# Patient Record
Sex: FEMALE | Race: BLACK OR AFRICAN AMERICAN | NOT HISPANIC OR LATINO | Employment: OTHER | URBAN - METROPOLITAN AREA
[De-identification: names, ages, dates, MRNs, and addresses within clinical notes are randomized per-mention and may not be internally consistent; named-entity substitution may affect disease eponyms.]

---

## 2019-07-21 ENCOUNTER — HOSPITAL ENCOUNTER (EMERGENCY)
Facility: HOSPITAL | Age: 74
Discharge: HOME | End: 2019-07-21
Attending: EMERGENCY MEDICINE
Payer: MEDICARE

## 2019-07-21 VITALS
SYSTOLIC BLOOD PRESSURE: 128 MMHG | TEMPERATURE: 98 F | OXYGEN SATURATION: 100 % | HEIGHT: 66 IN | DIASTOLIC BLOOD PRESSURE: 81 MMHG | BODY MASS INDEX: 40.18 KG/M2 | HEART RATE: 69 BPM | WEIGHT: 250 LBS | RESPIRATION RATE: 18 BRPM

## 2019-07-21 DIAGNOSIS — R07.9 CHEST PAIN: ICD-10-CM

## 2019-07-21 DIAGNOSIS — E86.0 DEHYDRATION: ICD-10-CM

## 2019-07-21 DIAGNOSIS — R53.1 WEAKNESS: Primary | ICD-10-CM

## 2019-07-21 LAB
ALBUMIN SERPL BCP-MCNC: 3.9 G/DL (ref 3.5–5.2)
ALP SERPL-CCNC: 76 U/L (ref 55–135)
ALT SERPL W/O P-5'-P-CCNC: 14 U/L (ref 10–44)
ANION GAP SERPL CALC-SCNC: 14 MMOL/L (ref 8–16)
AST SERPL-CCNC: 23 U/L (ref 10–40)
BASOPHILS # BLD AUTO: 0.01 K/UL (ref 0–0.2)
BASOPHILS NFR BLD: 0.1 % (ref 0–1.9)
BILIRUB SERPL-MCNC: 0.5 MG/DL (ref 0.1–1)
BNP SERPL-MCNC: 12 PG/ML (ref 0–99)
BUN SERPL-MCNC: 33 MG/DL (ref 8–23)
CALCIUM SERPL-MCNC: 10.1 MG/DL (ref 8.7–10.5)
CHLORIDE SERPL-SCNC: 106 MMOL/L (ref 95–110)
CK MB SERPL-MCNC: 1.9 NG/ML (ref 0.1–6.5)
CK MB SERPL-RTO: 1 % (ref 0–5)
CK SERPL-CCNC: 185 U/L (ref 20–180)
CK SERPL-CCNC: 185 U/L (ref 20–180)
CO2 SERPL-SCNC: 21 MMOL/L (ref 23–29)
CREAT SERPL-MCNC: 1.4 MG/DL (ref 0.5–1.4)
D DIMER PPP IA.FEU-MCNC: 1.21 MG/L FEU
DIFFERENTIAL METHOD: ABNORMAL
EOSINOPHIL # BLD AUTO: 0.1 K/UL (ref 0–0.5)
EOSINOPHIL NFR BLD: 0.4 % (ref 0–8)
ERYTHROCYTE [DISTWIDTH] IN BLOOD BY AUTOMATED COUNT: 14.3 % (ref 11.5–14.5)
EST. GFR  (AFRICAN AMERICAN): 43.3 ML/MIN/1.73 M^2
EST. GFR  (NON AFRICAN AMERICAN): 37.6 ML/MIN/1.73 M^2
GLUCOSE SERPL-MCNC: 111 MG/DL (ref 70–110)
HCT VFR BLD AUTO: 39.3 % (ref 37–48.5)
HGB BLD-MCNC: 13.1 G/DL (ref 12–16)
LYMPHOCYTES # BLD AUTO: 0.9 K/UL (ref 1–4.8)
LYMPHOCYTES NFR BLD: 6.1 % (ref 18–48)
MCH RBC QN AUTO: 26.5 PG (ref 27–31)
MCHC RBC AUTO-ENTMCNC: 33.3 G/DL (ref 32–36)
MCV RBC AUTO: 80 FL (ref 82–98)
MONOCYTES # BLD AUTO: 1.6 K/UL (ref 0.3–1)
MONOCYTES NFR BLD: 10.5 % (ref 4–15)
NEUTROPHILS # BLD AUTO: 12.8 K/UL (ref 1.8–7.7)
NEUTROPHILS NFR BLD: 82.9 % (ref 38–73)
PLATELET # BLD AUTO: 248 K/UL (ref 150–350)
PMV BLD AUTO: 9.7 FL (ref 9.2–12.9)
POTASSIUM SERPL-SCNC: 4.4 MMOL/L (ref 3.5–5.1)
PROT SERPL-MCNC: 8.5 G/DL (ref 6–8.4)
RBC # BLD AUTO: 4.94 M/UL (ref 4–5.4)
SODIUM SERPL-SCNC: 141 MMOL/L (ref 136–145)
TROPONIN I SERPL DL<=0.01 NG/ML-MCNC: 0.01 NG/ML (ref 0–0.03)
TROPONIN I SERPL DL<=0.01 NG/ML-MCNC: <0.006 NG/ML (ref 0–0.03)
WBC # BLD AUTO: 15.44 K/UL (ref 3.9–12.7)

## 2019-07-21 PROCEDURE — 93010 EKG 12-LEAD: ICD-10-PCS | Mod: ,,, | Performed by: INTERNAL MEDICINE

## 2019-07-21 PROCEDURE — 85379 FIBRIN DEGRADATION QUANT: CPT | Mod: ER

## 2019-07-21 PROCEDURE — 99285 EMERGENCY DEPT VISIT HI MDM: CPT | Mod: 25,ER

## 2019-07-21 PROCEDURE — 85025 COMPLETE CBC W/AUTO DIFF WBC: CPT | Mod: ER

## 2019-07-21 PROCEDURE — 93005 ELECTROCARDIOGRAM TRACING: CPT | Mod: ER

## 2019-07-21 PROCEDURE — 25000003 PHARM REV CODE 250: Mod: ER | Performed by: NURSE PRACTITIONER

## 2019-07-21 PROCEDURE — 96361 HYDRATE IV INFUSION ADD-ON: CPT | Mod: ER

## 2019-07-21 PROCEDURE — 82550 ASSAY OF CK (CPK): CPT | Mod: ER

## 2019-07-21 PROCEDURE — 84484 ASSAY OF TROPONIN QUANT: CPT | Mod: ER

## 2019-07-21 PROCEDURE — 25500020 PHARM REV CODE 255: Mod: ER | Performed by: EMERGENCY MEDICINE

## 2019-07-21 PROCEDURE — 80053 COMPREHEN METABOLIC PANEL: CPT | Mod: ER

## 2019-07-21 PROCEDURE — 99900035 HC TECH TIME PER 15 MIN (STAT): Mod: ER

## 2019-07-21 PROCEDURE — 83880 ASSAY OF NATRIURETIC PEPTIDE: CPT | Mod: ER

## 2019-07-21 PROCEDURE — 96374 THER/PROPH/DIAG INJ IV PUSH: CPT | Mod: ER

## 2019-07-21 PROCEDURE — 82553 CREATINE MB FRACTION: CPT | Mod: ER

## 2019-07-21 PROCEDURE — 25000003 PHARM REV CODE 250: Mod: ER | Performed by: EMERGENCY MEDICINE

## 2019-07-21 PROCEDURE — 96360 HYDRATION IV INFUSION INIT: CPT | Mod: 59

## 2019-07-21 PROCEDURE — 93010 ELECTROCARDIOGRAM REPORT: CPT | Mod: ,,, | Performed by: INTERNAL MEDICINE

## 2019-07-21 RX ORDER — ONDANSETRON 4 MG/1
4 TABLET, ORALLY DISINTEGRATING ORAL
Status: COMPLETED | OUTPATIENT
Start: 2019-07-21 | End: 2019-07-21

## 2019-07-21 RX ORDER — ASPIRIN 325 MG
325 TABLET ORAL
Status: COMPLETED | OUTPATIENT
Start: 2019-07-21 | End: 2019-07-21

## 2019-07-21 RX ORDER — TAMOXIFEN CITRATE 20 MG/1
20 TABLET ORAL DAILY
COMMUNITY

## 2019-07-21 RX ORDER — SIMVASTATIN 20 MG/1
20 TABLET, FILM COATED ORAL DAILY
COMMUNITY

## 2019-07-21 RX ORDER — ERGOCALCIFEROL 1.25 MG/1
1 CAPSULE ORAL
COMMUNITY

## 2019-07-21 RX ORDER — LOSARTAN POTASSIUM AND HYDROCHLOROTHIAZIDE 25; 100 MG/1; MG/1
1 TABLET ORAL DAILY
COMMUNITY

## 2019-07-21 RX ORDER — LEVOTHYROXINE SODIUM 88 UG/1
1 TABLET ORAL DAILY
COMMUNITY

## 2019-07-21 RX ADMIN — IOHEXOL 100 ML: 350 INJECTION, SOLUTION INTRAVENOUS at 05:07

## 2019-07-21 RX ADMIN — ASPIRIN 325 MG ORAL TABLET 325 MG: 325 PILL ORAL at 03:07

## 2019-07-21 RX ADMIN — ONDANSETRON 4 MG: 4 TABLET, ORALLY DISINTEGRATING ORAL at 03:07

## 2019-07-21 RX ADMIN — SODIUM CHLORIDE 1000 ML: 0.9 INJECTION, SOLUTION INTRAVENOUS at 05:07

## 2019-07-21 NOTE — ED NOTES
Multiple IV attempts by COLLEEN Meza. Able to obtain blood for lab specimen, unable to obtain patent IV. Unable to use R arm for IV access due to prior lumpectomy for breast CA. MD aware. Will attempt with US.

## 2019-07-21 NOTE — ED NOTES
One unsuccessful IV attempt. Unable to obtain blood from IV attempt. DANICA Meza aware. Will attempt IV insertion at this time.

## 2019-07-21 NOTE — ED PROVIDER NOTES
Encounter Date: 7/21/2019       History     Chief Complaint   Patient presents with    Emesis     Reports weakness and emesis. onset approx 20-30 min pta.      Underlying history of hypertension, gastroesophageal reflux disease, breast cancer in the past.  Not recently ill in any identified way.  No history of diabetes or heart disease.  Had just been at [employer], was walking out to her car when she developed nausea, generalized weakness, presyncope, and got in her car with her brother who does not drive.  She drove a short distance but had to stop because of persistent symptoms.  Once she stopped and got out she vomited x3, felt a little better, and called an ambulance.  On arrival, she is still feeling a little nausea but overall better in by the time this dictation all symptoms have resolved.  There was no chest pain, shortness of breath, palpitations, syncope, focal neurologic complaint, or other symptoms. No prior such episode.  EKG in the field and on arrival unremarkable.  Vital signs and exam on arrival unremarkable. No other problems identified.    The history is provided by the patient and the EMS personnel. No  was used.     Review of patient's allergies indicates:   Allergen Reactions    Codeine Hallucinations    Morphine Other (See Comments)     Hypotension     Past Medical History:   Diagnosis Date    Abnormal Pap smear     last abnormal pap smear 3 years ago    Breast cancer     Diverticulitis     GERD (gastroesophageal reflux disease)     Gout     Hypertension     Thyroid disease      Past Surgical History:   Procedure Laterality Date    APPENDECTOMY      COLOSTOMY      with reversal    colostomy reversal      HYSTERECTOMY  1972    fibroid tumors    OOPHORECTOMY Bilateral 1998    BSO for chronic pelvic pain    TOTAL HIP ARTHROPLASTY Right      Family History   Problem Relation Age of Onset    Diabetes Mother     Kidney disease Mother     Heart disease Father      Stroke Father     Breast cancer Neg Hx     Colon cancer Neg Hx     Ovarian cancer Neg Hx      Social History     Tobacco Use    Smoking status: Never Smoker    Smokeless tobacco: Never Used   Substance Use Topics    Alcohol use: No    Drug use: No     Review of Systems   Constitutional: Negative for activity change, fatigue and fever.   HENT: Negative for congestion, ear pain, facial swelling, nosebleeds, sinus pressure and sore throat.    Eyes: Negative for pain, discharge, redness and visual disturbance.   Respiratory: Negative for cough, choking, chest tightness, shortness of breath and wheezing.    Cardiovascular: Negative for chest pain, palpitations and leg swelling.   Gastrointestinal: Positive for nausea and vomiting. Negative for abdominal distention and abdominal pain.   Endocrine: Negative for heat intolerance, polydipsia and polyuria.   Genitourinary: Negative for difficulty urinating, dysuria, flank pain, hematuria and urgency.   Musculoskeletal: Negative for back pain, gait problem, joint swelling and myalgias.   Skin: Negative for color change and rash.   Allergic/Immunologic: Negative for environmental allergies and food allergies.   Neurological: Positive for weakness. Negative for dizziness, numbness and headaches.   Hematological: Negative for adenopathy. Does not bruise/bleed easily.   Psychiatric/Behavioral: Negative for agitation and behavioral problems. The patient is not nervous/anxious.    All other systems reviewed and are negative.      Physical Exam     Initial Vitals [07/21/19 1455]   BP Pulse Resp Temp SpO2   (!) 183/83 90 16 98 °F (36.7 °C) 99 %      MAP       --         Physical Exam    Nursing note and vitals reviewed.  Constitutional: She appears well-developed and well-nourished. She is not diaphoretic. No distress.   Mildly obese   HENT:   Head: Normocephalic and atraumatic.   Mouth/Throat: No oropharyngeal exudate.   Eyes: Conjunctivae and EOM are normal. Pupils are equal,  round, and reactive to light. Right eye exhibits no discharge. Left eye exhibits no discharge. No scleral icterus.   Neck: Normal range of motion. Neck supple. No thyromegaly present. No tracheal deviation present. No JVD present.   Cardiovascular: Normal rate, regular rhythm, normal heart sounds and intact distal pulses. Exam reveals no gallop and no friction rub.    No murmur heard.  Pulmonary/Chest: Breath sounds normal. No stridor. No respiratory distress. She has no wheezes. She has no rhonchi. She has no rales. She exhibits no tenderness.   Abdominal: Soft. Bowel sounds are normal. She exhibits no distension and no mass. There is no tenderness. There is no rebound and no guarding.   Musculoskeletal: Normal range of motion. She exhibits no edema or tenderness.   Neurological: She is alert and oriented to person, place, and time. She has normal strength.   Skin: Skin is warm and dry. No rash and no abscess noted. No erythema.   Psychiatric: She has a normal mood and affect. Her behavior is normal. Judgment and thought content normal.         ED Course   Procedures  Labs Reviewed   COMPREHENSIVE METABOLIC PANEL - Abnormal; Notable for the following components:       Result Value    CO2 21 (*)     Glucose 111 (*)     BUN, Bld 33 (*)     Total Protein 8.5 (*)     eGFR if  43.3 (*)     eGFR if non  37.6 (*)     All other components within normal limits   D DIMER, QUANTITATIVE - Abnormal; Notable for the following components:    D-Dimer 1.21 (*)     All other components within normal limits   CK - Abnormal; Notable for the following components:     (*)     All other components within normal limits   CK-MB - Abnormal; Notable for the following components:     (*)     All other components within normal limits   CBC W/ AUTO DIFFERENTIAL - Abnormal; Notable for the following components:    WBC 15.44 (*)     Mean Corpuscular Volume 80 (*)     Mean Corpuscular Hemoglobin 26.5  (*)     Gran # (ANC) 12.8 (*)     Lymph # 0.9 (*)     Mono # 1.6 (*)     Gran% 82.9 (*)     Lymph% 6.1 (*)     All other components within normal limits   TROPONIN I   B-TYPE NATRIURETIC PEPTIDE   TROPONIN I   URINALYSIS, REFLEX TO URINE CULTURE     EKG Readings: (Independently Interpreted)   Initial Reading: No STEMI. Rhythm: Normal Sinus Rhythm. Heart Rate: 89. Ectopy: No Ectopy. Conduction: Normal. Axis: Normal.   Possible left atrial enlargement, sinus arrhythmia, nonspecific T-wave abnormality, no acute concerning change.       Imaging Results          CTA Chest Non-Coronary (PE Study) (Final result)  Result time 07/21/19 17:22:32    Final result by Sheng Dominguez MD (07/21/19 17:22:32)                 Impression:      No pulmonary embolism or acute aortic disease.  Mild cylindrical bronchiectasis.  Small hiatal hernia.      Electronically signed by: Sheng Dominguez  Date:    07/21/2019  Time:    17:22             Narrative:    EXAMINATION:  CTA CHEST NON CORONARY    CLINICAL HISTORY:  Chest pain, acute, PE suspected, intermed prob, negative D-dimer;    TECHNIQUE:  Low dose axial images, sagittal and coronal reformations were obtained from the thoracic inlet to the lung bases following the IV administration of 100 mL of Omnipaque 350.  All CT scans at this location are performed using dose modulation techniques as appropriate to a performed exam including the following: Automated exposure control; adjustment of the mA and/or kV  according to patient size.    COMPARISON:  07/21/2019    FINDINGS:  Base of Neck: No significant abnormality.    Thoracic soft tissues: Evidence of prior right breast surgery.  Recommend correlation with prior mammography.    Aorta: Left-sided aortic arch.  No aneurysm and no significant atherosclerosis    Heart: Normal size. No effusion.    Pulmonary vasculature: Pulmonary arteries distribute normally.  There are four pulmonary veins.  No evidence of pulmonary  embolism.    Debbi/Mediastinum: No pathologic ronnie enlargement.    Airways: Mild cylindrical bronchiectasis.    Lungs/Pleura: Clear lungs. No pleural effusion or thickening.    Esophagus: Small hiatal hernia.    Upper Abdomen: No abnormality of the partially imaged upper abdomen.    Bones: No acute fracture. No suspicious lytic or sclerotic lesions.                               X-Ray Chest PA And Lateral (Final result)  Result time 07/21/19 16:10:11    Final result by Sheng Dominguez MD (07/21/19 16:10:11)                 Impression:      No acute abnormality.      Electronically signed by: Sheng Dominguez  Date:    07/21/2019  Time:    16:10             Narrative:    EXAMINATION:  XR CHEST PA AND LATERAL    CLINICAL HISTORY:  Chest Pain;    TECHNIQUE:  PA and lateral views of the chest were performed.    COMPARISON:  None    FINDINGS:  The lungs are clear, with normal appearance of pulmonary vasculature and no pleural effusion or pneumothorax.    The cardiac silhouette is normal in size. The hilar and mediastinal contours are unremarkable.    Bones are intact.                                5:33 PM Vital signs stable, resting comfortably, no further symptoms. Counseled, discussed in detail, responding well to IV fluids and rest.  Await repeat troponin, anticipate discharge after that if negative.                              Clinical Impression:     1. Weakness    2. Chest pain    3. Dehydration         Disposition:   Disposition: Discharged  Condition: Stable                        Shad Alvarado MD  07/21/19 1900

## 2019-07-21 NOTE — ED NOTES
Pt informed of need of urine specimen. Aware of plan of care. Awaiting labs. Pt sitting up in stretcher, NAD. Resp e/u. SR on monitor. Family at bedside. Will continue to monitor. Denies any change in status at this time.

## 2019-07-21 NOTE — ED NOTES
Pt transported to restroom via wheelchair per MILA García. Denies nausea at this time. Reports that she feels much better than when she arrived. No complaints or questions at this time.

## 2019-07-21 NOTE — ED NOTES
Pt arrived to ED via [facility] after having episode of weakness, nausea, and diaphoresis after leaving [place] just prior to arrival. Pt reports feeling fine prior to episode. Reports one episode of emesis with nausea.     Level of Consciousness: Patient is awake, alert, oriented to person, place, time, and situation.    Appearance: Pt resting comfortably in stretcher, no acute distress at this time. Clothing appropriately placed and clean. Hygiene is appropriate.   Skin: Skin is warm, intact with mild diaphoresis. Skin turgor is normal/elastic. Mucous membranes moist. Skin color is normal for ethnicity. No skin breakdown noted.  Musculoskeletal: Moves all extremities well. Full active ROM. No deformities noted. Generalized weakness. Gait unwitnessed.   Respiratory: Airway open and patent. Respirations equal and unlabored. Breath sounds clear to auscultation. Denies any SOB.   Cardiac: Regular rate and rhythm. No peripheral edema noted. Radial and pedal pulses present and normal. Capillary refill is within normal limits. Denies chest pain.    GI: Abdomen soft, non-tender to all quadrants with palpation. Bowel sounds present and active in all quads. Obese. Abdomen symmetric with no distention noted. Denies any diarrhea. One episode of emesis prior to arrival, + nausea at this time.     Neurological: Symmetrical expressions noted to face. Equal bilateral . No obvious neurological deficits noted.   Psychosocial: Speech spontaneous, clear, and coherent. Appropriate to situation. Family at bedside. Pt is calm and cooperative.     Pt informed of plan of care, verbalizes understanding, and denies any other questions, complaints, or concerns at this time. Bed in locked in lowest position, siderails up x2, call light within reach. Pt placed on cardiac monitor, blood pressure cuff and continuous pulse ox. Will continue to monitor.

## 2019-07-21 NOTE — ED NOTES
Pt sitting up in stretcher, NAD. resp e/u. Reports that she was having nausea during the CT with contrast, but it has resolved at this time. Denies abdominal pain, nausea, weakness, CP, SOB or any symptoms at this time. Talking to family members at bedside, smiling and laughing. Appears comfortable. No questions at this time. Will continue to monitor. Bed locked in lowest position. Side rails up x 2. Aware of plan of care.

## 2019-07-22 NOTE — ED NOTES
Patient in bed resting at this time with no complaints, chest rise fall equal and unlabored, denies chest pain, dizziness, n/v at this time. Skin warm dry and intact. Iv bolus ns running at this time, patient understands she will be discharged after fluids are finished. C monitor on and recording at this time.

## 2024-06-10 ENCOUNTER — HOSPITAL ENCOUNTER (OUTPATIENT)
Dept: RADIOLOGY | Facility: HOSPITAL | Age: 79
Discharge: HOME | End: 2024-06-10
Attending: INTERNAL MEDICINE
Payer: MEDICARE

## 2024-06-10 DIAGNOSIS — Z96.641 PRESENCE OF RIGHT ARTIFICIAL HIP JOINT: ICD-10-CM

## 2024-06-10 DIAGNOSIS — Z96.641 HISTORY OF RIGHT HIP REPLACEMENT: ICD-10-CM

## 2024-06-10 PROCEDURE — 73502 X-RAY EXAM HIP UNI 2-3 VIEWS: CPT | Mod: 26,RT,, | Performed by: RADIOLOGY

## 2024-06-10 PROCEDURE — 73502 X-RAY EXAM HIP UNI 2-3 VIEWS: CPT | Mod: TC,PO,RT

## 2024-09-18 PROBLEM — C50.919 MALIGNANT NEOPLASM OF BREAST METASTATIC TO AXILLARY LYMPH NODE: Status: ACTIVE | Noted: 2024-09-18

## 2024-09-18 PROBLEM — C77.3 MALIGNANT NEOPLASM OF BREAST METASTATIC TO AXILLARY LYMPH NODE: Status: ACTIVE | Noted: 2024-09-18

## 2024-09-18 PROBLEM — C50.612: Status: ACTIVE | Noted: 2024-09-18

## 2024-09-18 NOTE — PROGRESS NOTES
"         Date of Service: 9/23/2024    Chief Complaint:   Fanny Reid is a 79 y.o. female  is presenting today to discuss her new diagnosis of left breast cancer, its workup and treatment options.    History of Present Illness:   Mrs. Ruiz was diagnosed with "occult" left breast cancer  in July 2024 at the age of 79. ER/OH Positive and HER 2 FISH negative.  Ki67 = 60%. She has a history of right breast cancer now s/p lumpectomy and radiation.  She completed 5 years of Tamoxifen. Gene Negative (2024).  MD::::Pia White MD; Shad Hussein MD; El Lau MD    Past Medical History:   Diagnosis Date    Abnormal Pap smear     last abnormal pap smear 3 years ago    Breast cancer     Diverticulitis     GERD (gastroesophageal reflux disease)     Gout     Hypertension     Thyroid disease       Past Surgical History:   Procedure Laterality Date    APPENDECTOMY      COLOSTOMY      with reversal    colostomy reversal      HYSTERECTOMY  1972    fibroid tumors    OOPHORECTOMY Bilateral 1998    BSO for chronic pelvic pain    TOTAL HIP ARTHROPLASTY Right         Current Outpatient Medications:     allopurinol (ZYLOPRIM) 100 MG tablet, Take 1 tablet (100 mg total) by mouth daily as needed., Disp: 30 tablet, Rfl: 6    cloNIDine (CATAPRES) 0.2 MG tablet, TAKE 1 TABLET BY MOUTH DAILY, Disp: 30 tablet, Rfl: 6    ergocalciferol (ERGOCALCIFEROL) 50,000 unit Cap, Take 1 capsule by mouth every 7 days. , Disp: , Rfl:     levothyroxine (SYNTHROID) 88 MCG tablet, Take 1 tablet by mouth Daily., Disp: , Rfl:     losartan-hydrochlorothiazide 100-25 mg (HYZAAR) 100-25 mg per tablet, Take 1 tablet by mouth Daily., Disp: , Rfl:     simvastatin (ZOCOR) 20 MG tablet, Take 20 mg by mouth Daily., Disp: , Rfl:     tamoxifen (NOLVADEX) 20 MG Tab, Take 20 mg by mouth Daily., Disp: , Rfl:    Review of patient's allergies indicates:   Allergen Reactions    Codeine Hallucinations    Morphine Other (See Comments)     Hypotension    "   Social History     Tobacco Use    Smoking status: Never    Smokeless tobacco: Never   Substance Use Topics    Alcohol use: No      Family History   Problem Relation Name Age of Onset    Diabetes Mother      Kidney disease Mother      Heart disease Father      Stroke Father      Breast cancer Neg Hx      Colon cancer Neg Hx      Ovarian cancer Neg Hx          Review of Systems   Integumentary:  Positive for breast mass. Negative for color change, rash, mole/lesion, breast discharge and breast tenderness.   Breast: Positive for mass.Negative for tenderness       Physical Exam   Constitutional: She appears well-developed. She is cooperative.   HENT:   Head: Normocephalic.   Cardiovascular:  Normal rate and regular rhythm.            Pulmonary/Chest: She exhibits no tenderness and no bony tenderness. Right breast exhibits no mass, no nipple discharge, no skin change and no tenderness. Left breast exhibits mass. Left breast exhibits no nipple discharge, no skin change and no tenderness.       Abdominal: Soft. Normal appearance.   Musculoskeletal: Lymphadenopathy:      Upper Body:      Right upper body: No supraclavicular or axillary adenopathy.      Left upper body: No supraclavicular or axillary adenopathy.     Neurological: She is alert.   Skin: No rash noted.     MAMMOGRAM REPORT: enlarged LN in left axilla    ULTRASOUND REPORT: 3.2 cm lymph node in left axilla    ASSESSMENT and PLAN OF CARE     1. 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  We discussed a multidisciplinary approach to the treatment of her breast cancer - which can include a Medical and Radiation Oncologist, a Geneticists, a Plastic Surgeon, and our Cancer Navigator.  We discussed mastectomy with or without reconstruction in addition to Axillary Pili Dissection as surgical treatments for occult breast cancers.  BCS can be  "offered if the primary tumor can be located.  We discussed the importance of her positive lymph node and the need for axillary dissection at the time of definitive surgery.      We discussed the use of chemotherapy, radiation, and anti-estrogen treatment in detail.  Dr. Hussein would like to proceed with tristin-adjuvant chemotherapy so we will place a Port soon.  She understands that more information will be gained from an MRI, PET Fusion Scan, Lab evaluation (and other imaging studies if needed). She tells me that her MRI and CT Scans were done by Dr. Hussein at the [facility].  I will obtain these for review.  She will return in one week to discuss all results and upcoming surgery.  At least 45 minutes of direct face to face discussions were held with this patient.  She will be followed according to NCCN Guidelines.    Genetic Counseling:  She states that Dr. Hussein genetically tested her and the results were negative.    Consultants:  She has seen Dr. Hussein; El Lau MD    In accordance with [place] R[place] 40:1300.154, the brochure "Breast Cancer Treatment Alternatives" was discussed with and given to this patient during this consult for her new diagnosis of breast cancer. Attention was given to the advantages, disadvantages, risks, and descriptions of the procedure regarding medically viable and efficacious alternative methods of treatment for breast cancer including surgical, radiological, and chemotherapeutic treatments and/or combinations of these treatments. The problems, beneifits and alternatives to breast cancer reconstruction surgery was also discussed. A written summary of these findings is found in my "Consultation Form" that is given to the patient and scanned into the patient medical record.     More than 60 minutes was spent by me on this patient's consultation.  This includes (but is not limited to): researching imaging and pathology reports as available and independently interpreting them; " reviewing the pertinent medical/surgical history and obtaining aspects of these not already in the record; documenting information/findings in the electronic medical record and communicating all results and a plan of care to the patient and her family.  Face to face time is also included as above.         Medical Decision Making:  It is my impression that the patient suffers from all the listed conditions.  Each of these conditions did affect my Plan of Care and all medical decisions that were rendered.  The medical decision making was of high difficulty based on her co-morbidities and her new diagnosis of Breast Cancer, which can pose a direct threat to her life.  Laboratory, staging, and imaging evaluations are currently pending but will be reviewed once available. Any further recommendations will be communicated to the patient by me either prior to or at her pre-operative visit.  I have reviewed and verified her allergies, list of medications, medical and surgical histories, social history, and a pertinent review of symptoms.    Follow up: 1-2 weeks for Pre Op (Port Placement)

## 2024-09-18 NOTE — ASSESSMENT & PLAN NOTE
"This diagnosis of an occult breast cancer was discussed with the patient in detail.  We discussed a multidisciplinary approach to the treatment of her breast cancer - which can include a Medical and Radiation Oncologist, a Geneticists, a Plastic Surgeon, and our Cancer Navigator.  We discussed mastectomy with or without reconstruction in addition to Axillary Pili Dissection as surgical treatments for occult breast cancers.  BCS can be offered if the primary tumor can be located.  We discussed the importance of her positive lymph node and the need for axillary dissection at the time of definitive surgery.      We discussed the use of chemotherapy, radiation, and anti-estrogen treatment in detail.  Dr. Hussein would like to proceed with tristin-adjuvant chemotherapy so we will place a Port soon.  She understands that more information will be gained from an MRI, PET Fusion Scan, Lab evaluation (and other imaging studies if needed). She tells me that her MRI and CT Scans were done by Dr. Hussein at the [facility].  I will obtain these for review.  She will return in one week to discuss all results and upcoming surgery.  At least 45 minutes of direct face to face discussions were held with this patient.  She will be followed according to NCCN Guidelines.    Genetic Counseling:  She states that Dr. Hussein genetically tested her and the results were negative.    Consultants:  She has seen Dr. Hussein; El Lau MD    In accordance with [place] R.S. 40:1300.154, the brochure "Breast Cancer Treatment Alternatives" was discussed with and given to this patient during this consult for her new diagnosis of breast cancer. Attention was given to the advantages, disadvantages, risks, and descriptions of the procedure regarding medically viable and efficacious alternative methods of treatment for breast cancer including surgical, radiological, and chemotherapeutic treatments and/or combinations of these treatments. The " "problems, beneifits and alternatives to breast cancer reconstruction surgery was also discussed. A written summary of these findings is found in my "Consultation Form" that is given to the patient and scanned into the patient medical record.     More than 60 minutes was spent by me on this patient's consultation.  This includes (but is not limited to): researching imaging and pathology reports as available and independently interpreting them; reviewing the pertinent medical/surgical history and obtaining aspects of these not already in the record; documenting information/findings in the electronic medical record and communicating all results and a plan of care to the patient and her family.  Face to face time is also included as above.     "

## 2024-09-23 ENCOUNTER — OFFICE VISIT (OUTPATIENT)
Dept: SURGERY | Facility: CLINIC | Age: 79
End: 2024-09-23
Payer: MEDICARE

## 2024-09-23 DIAGNOSIS — C77.3 MALIGNANT NEOPLASM OF BREAST METASTATIC TO AXILLARY LYMPH NODE: Primary | ICD-10-CM

## 2024-09-23 DIAGNOSIS — C50.919 MALIGNANT NEOPLASM OF BREAST METASTATIC TO AXILLARY LYMPH NODE: Primary | ICD-10-CM

## 2024-09-23 PROCEDURE — 1160F RVW MEDS BY RX/DR IN RCRD: CPT | Mod: CPTII,S$GLB,, | Performed by: SPECIALIST

## 2024-09-23 PROCEDURE — 1159F MED LIST DOCD IN RCRD: CPT | Mod: CPTII,S$GLB,, | Performed by: SPECIALIST

## 2024-09-23 PROCEDURE — 99999 PR PBB SHADOW E&M-EST. PATIENT-LVL I: CPT | Mod: PBBFAC,,, | Performed by: SPECIALIST

## 2024-09-23 PROCEDURE — 99205 OFFICE O/P NEW HI 60 MIN: CPT | Mod: S$GLB,,, | Performed by: SPECIALIST

## 2024-09-24 ENCOUNTER — TELEPHONE (OUTPATIENT)
Dept: SURGERY | Facility: CLINIC | Age: 79
End: 2024-09-24
Payer: MEDICARE

## 2024-09-24 PROBLEM — Z13.71 BRCA NEGATIVE: Status: ACTIVE | Noted: 2024-09-24

## 2024-09-24 NOTE — ASSESSMENT & PLAN NOTE
MRI shows multifocal cancer in the left breast.  She knows that MRM will be needed after chemotherapy.    All testing results have been discussed in detail.  We talked about her workup findings and all Specialty recommendations. All R/B/I and alternatives to treatment have been discussed with the patient with respect to all available surgical options. These risks involve (but are not limited to): bleeding, infections, pneumothorax, injury to a major blood vessel, inability to pass catheter, device failure, medication reaction, poor wound healing, etc.   All testing results have been discussed in detail. She appears to be making informed decisions concerning treatment for her breast cancer. We talked about her workup findings and all Specialty recommendations. We will proceed at her convenience.     PLAN:::PORT (VENOUS ACCESS DEVICE) PLACEMENT.

## 2024-09-24 NOTE — ASSESSMENT & PLAN NOTE
We discussed the implications of her Negative Gene Test at length.  She knows that testing in the future may be necessary as this test will change as more genetic mutations are identified.  She understands that even though she is gene negative - that she can still develop a breast cancer.

## 2024-09-24 NOTE — TELEPHONE ENCOUNTER
SPOKE WITH THE PATIENT TO CONFIRM HER PRE-OP APPT. 09/30/24 @ 9A - SHE VERBALLY AGREED WITHOUT QUESTION OR CONCERN

## 2024-09-24 NOTE — PROGRESS NOTES
"           Date of Service: 9/30/2024    Chief Complaint:   Fanny Reid is a 79 y.o. female  is presenting today to discuss definitive treatment options for her new diagnosis of left breast cancer, its workup and treatment options.    History of Present Illness:   Mrs. Ruiz was diagnosed with "occult" left breast cancer  in July 2024 at the age of 79. MRI confirmed multifocal cancer in the left breast spanning over 5 cm.  ER/OK Positive and HER 2 FISH negative.  Ki67 = 60%. She has a history of right breast cancer now s/p lumpectomy and radiation.  She completed 5 years of Tamoxifen. MyRisk Negative (2024).  MD::::Pia White MD; Shad Hussein MD; El Lau MD    Past Medical History:   Diagnosis Date    Abnormal Pap smear     last abnormal pap smear 3 years ago    Breast cancer     Diverticulitis     GERD (gastroesophageal reflux disease)     Gout     Hypertension     Thyroid disease       Past Surgical History:   Procedure Laterality Date    APPENDECTOMY      COLOSTOMY      with reversal    colostomy reversal      HYSTERECTOMY  1972    fibroid tumors    OOPHORECTOMY Bilateral 1998    BSO for chronic pelvic pain    TOTAL HIP ARTHROPLASTY Right         Current Outpatient Medications:     allopurinol (ZYLOPRIM) 100 MG tablet, Take 1 tablet (100 mg total) by mouth daily as needed., Disp: 30 tablet, Rfl: 6    cloNIDine (CATAPRES) 0.2 MG tablet, TAKE 1 TABLET BY MOUTH DAILY, Disp: 30 tablet, Rfl: 6    ergocalciferol (ERGOCALCIFEROL) 50,000 unit Cap, Take 1 capsule by mouth every 7 days. , Disp: , Rfl:     levothyroxine (SYNTHROID) 88 MCG tablet, Take 1 tablet by mouth Daily., Disp: , Rfl:     losartan-hydrochlorothiazide 100-25 mg (HYZAAR) 100-25 mg per tablet, Take 1 tablet by mouth Daily., Disp: , Rfl:     simvastatin (ZOCOR) 20 MG tablet, Take 20 mg by mouth Daily., Disp: , Rfl:     tamoxifen (NOLVADEX) 20 MG Tab, Take 20 mg by mouth Daily., Disp: , Rfl:    Review of patient's allergies " indicates:   Allergen Reactions    Codeine Hallucinations    Morphine Other (See Comments)     Hypotension      Social History     Tobacco Use    Smoking status: Never    Smokeless tobacco: Never   Substance Use Topics    Alcohol use: No      Family History   Problem Relation Name Age of Onset    Diabetes Mother      Kidney disease Mother      Heart disease Father      Stroke Father      Breast cancer Neg Hx      Colon cancer Neg Hx      Ovarian cancer Neg Hx          Review of Systems   Integumentary:  Positive for breast mass. Negative for color change, rash, mole/lesion, breast discharge and breast tenderness.   Breast: Positive for mass.Negative for tenderness       Physical Exam   Constitutional: She appears well-developed. She is cooperative.   HENT:   Head: Normocephalic.   Cardiovascular:  Normal rate and regular rhythm.            Pulmonary/Chest: She exhibits no tenderness and no bony tenderness. Right breast exhibits no mass, no nipple discharge, no skin change and no tenderness. Left breast exhibits mass. Left breast exhibits no nipple discharge, no skin change and no tenderness.       Abdominal: Soft. Normal appearance.   Musculoskeletal: Lymphadenopathy:      Upper Body:      Right upper body: No supraclavicular or axillary adenopathy.      Left upper body: No supraclavicular or axillary adenopathy.     Neurological: She is alert.   Skin: No rash noted.     MAMMOGRAM REPORT: enlarged LN in left axilla    ULTRASOUND REPORT: 3.2 cm lymph node in left axilla    MRI REPORT: RIGHT breast normal with no abnormal findings; LEFT BREAST with evidence of multifocal cancer    CT REPORT: axillary LAD only with no evidence of distant mets    ASSESSMENT and PLAN OF CARE     1. 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  She knows that MRM will be needed after chemotherapy.    All testing results have been discussed in detail.   We talked about her workup findings and all Specialty recommendations. All R/B/I and alternatives to treatment have been discussed with the patient with respect to all available surgical options. These risks involve (but are not limited to): bleeding, infections, pneumothorax, injury to a major blood vessel, inability to pass catheter, device failure, medication reaction, poor wound healing, etc.   All testing results have been discussed in detail. She appears to be making informed decisions concerning treatment for her breast cancer. We talked about her workup findings and all Specialty recommendations. We will proceed at her convenience.     PLAN:::PORT (VENOUS ACCESS DEVICE) PLACEMENT.       2. BRCA negative  Assessment & Plan:  We discussed the implications of her Negative Gene Test at length.  She knows that testing in the future may be necessary as this test will change as more genetic mutations are identified.  She understands that even though she is gene negative - that she can still develop a breast cancer.       Medical Decision Making:  It is my impression that the patient suffers from all the listed conditions.  Each of these conditions did affect my Plan of Care and all medical decisions that were rendered.  The medical decision making was of moderate to high difficulty based on her co-morbidities and her new diagnosis of Breast Cancer, which can pose a direct threat to her life.  Laboratory, staging, and imaging findings were discussed.  We talked about all risks, benefits, indications and alternatives to standard breast cancer treatments.  I have reviewed and verified her allergies, list of medications, medical and surgical histories, social history, and a pertinent review of symptoms.  I am convinced that she is digesting all this information well and I support her surgical decision.     Follow up: 3 months and prn for PE

## 2024-09-30 ENCOUNTER — OFFICE VISIT (OUTPATIENT)
Dept: SURGERY | Facility: CLINIC | Age: 79
End: 2024-09-30
Payer: MEDICARE

## 2024-09-30 ENCOUNTER — TELEPHONE (OUTPATIENT)
Dept: SURGERY | Facility: CLINIC | Age: 79
End: 2024-09-30

## 2024-09-30 DIAGNOSIS — C77.3 MALIGNANT NEOPLASM OF BREAST METASTATIC TO AXILLARY LYMPH NODE: Primary | ICD-10-CM

## 2024-09-30 DIAGNOSIS — Z13.71 BRCA NEGATIVE: ICD-10-CM

## 2024-09-30 DIAGNOSIS — C50.919 MALIGNANT NEOPLASM OF BREAST METASTATIC TO AXILLARY LYMPH NODE: Primary | ICD-10-CM

## 2024-09-30 PROCEDURE — 1159F MED LIST DOCD IN RCRD: CPT | Mod: CPTII,S$GLB,, | Performed by: SPECIALIST

## 2024-09-30 PROCEDURE — 1160F RVW MEDS BY RX/DR IN RCRD: CPT | Mod: CPTII,S$GLB,, | Performed by: SPECIALIST

## 2024-09-30 PROCEDURE — 99999 PR PBB SHADOW E&M-EST. PATIENT-LVL I: CPT | Mod: PBBFAC,,, | Performed by: SPECIALIST

## 2024-09-30 PROCEDURE — 99214 OFFICE O/P EST MOD 30 MIN: CPT | Mod: S$GLB,,, | Performed by: SPECIALIST

## 2024-09-30 NOTE — TELEPHONE ENCOUNTER
CALL PT. TO INFORM HER OF THE NEW PORT PLACEMENT DATE OF 10/08/24 @ 1P WITH NO ANSWER - LEFT VM OF DATE AND TIME WITH INSTRUCTION TO CALL BACK IF ANY QUESTIONS OR ISSUES.

## 2024-10-08 ENCOUNTER — OUTSIDE PLACE OF SERVICE (OUTPATIENT)
Dept: SURGERY | Facility: CLINIC | Age: 79
End: 2024-10-08
Payer: MEDICARE

## 2024-11-16 ENCOUNTER — HOSPITAL ENCOUNTER (EMERGENCY)
Facility: HOSPITAL | Age: 79
Discharge: HOME | End: 2024-11-16
Attending: EMERGENCY MEDICINE
Payer: MEDICARE

## 2024-11-16 VITALS
SYSTOLIC BLOOD PRESSURE: 141 MMHG | DIASTOLIC BLOOD PRESSURE: 70 MMHG | HEIGHT: 68 IN | HEART RATE: 70 BPM | OXYGEN SATURATION: 100 % | RESPIRATION RATE: 17 BRPM | TEMPERATURE: 98 F | BODY MASS INDEX: 34.86 KG/M2 | WEIGHT: 230 LBS

## 2024-11-16 DIAGNOSIS — R53.1 WEAKNESS: ICD-10-CM

## 2024-11-16 DIAGNOSIS — D72.829 LEUKOCYTOSIS, UNSPECIFIED TYPE: Primary | ICD-10-CM

## 2024-11-16 DIAGNOSIS — R55 SYNCOPE: ICD-10-CM

## 2024-11-16 LAB
ALBUMIN SERPL BCP-MCNC: 3.2 G/DL (ref 3.5–5.2)
ALP SERPL-CCNC: 107 U/L (ref 40–150)
ALT SERPL W/O P-5'-P-CCNC: 16 U/L (ref 10–44)
ANION GAP SERPL CALC-SCNC: 10 MMOL/L (ref 8–16)
ANISOCYTOSIS BLD QL SMEAR: SLIGHT
AST SERPL-CCNC: 13 U/L (ref 10–40)
BACTERIA #/AREA URNS AUTO: NORMAL /HPF
BASOPHILS # BLD AUTO: ABNORMAL K/UL (ref 0–0.2)
BASOPHILS NFR BLD: 0 % (ref 0–1.9)
BILIRUB SERPL-MCNC: 0.6 MG/DL (ref 0.1–1)
BILIRUB UR QL STRIP: NEGATIVE
BUN SERPL-MCNC: 28 MG/DL (ref 8–23)
CALCIUM SERPL-MCNC: 8.7 MG/DL (ref 8.7–10.5)
CHLORIDE SERPL-SCNC: 107 MMOL/L (ref 95–110)
CLARITY UR REFRACT.AUTO: CLEAR
CO2 SERPL-SCNC: 21 MMOL/L (ref 23–29)
COLOR UR AUTO: ABNORMAL
CREAT SERPL-MCNC: 1.2 MG/DL (ref 0.5–1.4)
DIFFERENTIAL METHOD BLD: ABNORMAL
EOSINOPHIL # BLD AUTO: ABNORMAL K/UL (ref 0–0.5)
EOSINOPHIL NFR BLD: 0 % (ref 0–8)
ERYTHROCYTE [DISTWIDTH] IN BLOOD BY AUTOMATED COUNT: 15.6 % (ref 11.5–14.5)
EST. GFR  (NO RACE VARIABLE): 46.6 ML/MIN/1.73 M^2
GLUCOSE SERPL-MCNC: 111 MG/DL (ref 70–110)
GLUCOSE UR QL STRIP: NEGATIVE
HCT VFR BLD AUTO: 33.9 % (ref 37–48.5)
HGB BLD-MCNC: 11.6 G/DL (ref 12–16)
HGB UR QL STRIP: ABNORMAL
HYALINE CASTS UR QL AUTO: 0 /LPF
IMM GRANULOCYTES # BLD AUTO: ABNORMAL K/UL (ref 0–0.04)
IMM GRANULOCYTES NFR BLD AUTO: ABNORMAL % (ref 0–0.5)
KETONES UR QL STRIP: NEGATIVE
LEUKOCYTE ESTERASE UR QL STRIP: NEGATIVE
LYMPHOCYTES # BLD AUTO: ABNORMAL K/UL (ref 1–4.8)
LYMPHOCYTES NFR BLD: 11 % (ref 18–48)
MCH RBC QN AUTO: 26.4 PG (ref 27–31)
MCHC RBC AUTO-ENTMCNC: 34.2 G/DL (ref 32–36)
MCV RBC AUTO: 77 FL (ref 82–98)
METAMYELOCYTES NFR BLD MANUAL: 4 %
MICROSCOPIC COMMENT: NORMAL
MONOCYTES # BLD AUTO: ABNORMAL K/UL (ref 0.3–1)
MONOCYTES NFR BLD: 2 % (ref 4–15)
MYELOCYTES NFR BLD MANUAL: 2 %
NEUTROPHILS NFR BLD: 81 % (ref 38–73)
NITRITE UR QL STRIP: NEGATIVE
NRBC BLD-RTO: 0 /100 WBC
PH UR STRIP: 6 [PH] (ref 5–8)
PLATELET # BLD AUTO: 229 K/UL (ref 150–450)
PMV BLD AUTO: 9.3 FL (ref 9.2–12.9)
POCT GLUCOSE: 119 MG/DL (ref 70–110)
POIKILOCYTOSIS BLD QL SMEAR: SLIGHT
POTASSIUM SERPL-SCNC: 3.4 MMOL/L (ref 3.5–5.1)
PROT SERPL-MCNC: 6.8 G/DL (ref 6–8.4)
PROT UR QL STRIP: ABNORMAL
RBC # BLD AUTO: 4.4 M/UL (ref 4–5.4)
RBC #/AREA URNS AUTO: 0 /HPF (ref 0–4)
SMUDGE CELLS BLD QL SMEAR: PRESENT
SODIUM SERPL-SCNC: 138 MMOL/L (ref 136–145)
SP GR UR STRIP: 1.01 (ref 1–1.03)
SQUAMOUS #/AREA URNS AUTO: 2 /HPF
TROPONIN I SERPL DL<=0.01 NG/ML-MCNC: 0.01 NG/ML (ref 0–0.03)
URN SPEC COLLECT METH UR: ABNORMAL
UROBILINOGEN UR STRIP-ACNC: NEGATIVE EU/DL
WBC # BLD AUTO: 18.37 K/UL (ref 3.9–12.7)
WBC #/AREA URNS AUTO: 5 /HPF (ref 0–5)

## 2024-11-16 PROCEDURE — 85007 BL SMEAR W/DIFF WBC COUNT: CPT | Mod: ER | Performed by: NURSE PRACTITIONER

## 2024-11-16 PROCEDURE — 86803 HEPATITIS C AB TEST: CPT | Performed by: EMERGENCY MEDICINE

## 2024-11-16 PROCEDURE — 96374 THER/PROPH/DIAG INJ IV PUSH: CPT | Mod: ER

## 2024-11-16 PROCEDURE — 85027 COMPLETE CBC AUTOMATED: CPT | Mod: ER | Performed by: NURSE PRACTITIONER

## 2024-11-16 PROCEDURE — 63600175 PHARM REV CODE 636 W HCPCS: Mod: ER | Performed by: EMERGENCY MEDICINE

## 2024-11-16 PROCEDURE — 87389 HIV-1 AG W/HIV-1&-2 AB AG IA: CPT | Performed by: EMERGENCY MEDICINE

## 2024-11-16 PROCEDURE — 93005 ELECTROCARDIOGRAM TRACING: CPT | Mod: ER

## 2024-11-16 PROCEDURE — 80053 COMPREHEN METABOLIC PANEL: CPT | Mod: ER | Performed by: NURSE PRACTITIONER

## 2024-11-16 PROCEDURE — 93010 ELECTROCARDIOGRAM REPORT: CPT | Mod: ,,, | Performed by: INTERNAL MEDICINE

## 2024-11-16 PROCEDURE — 81000 URINALYSIS NONAUTO W/SCOPE: CPT | Mod: ER | Performed by: NURSE PRACTITIONER

## 2024-11-16 PROCEDURE — 99285 EMERGENCY DEPT VISIT HI MDM: CPT | Mod: 25,ER

## 2024-11-16 PROCEDURE — 84484 ASSAY OF TROPONIN QUANT: CPT | Mod: ER | Performed by: NURSE PRACTITIONER

## 2024-11-16 RX ORDER — HEPARIN 100 UNIT/ML
5 SYRINGE INTRAVENOUS
Status: COMPLETED | OUTPATIENT
Start: 2024-11-16 | End: 2024-11-16

## 2024-11-16 RX ADMIN — HEPARIN 500 UNITS: 100 SYRINGE at 02:11

## 2024-11-16 NOTE — ED PROVIDER NOTES
Encounter Date: 11/16/2024       History     Chief Complaint   Patient presents with    Weakness     Pt had near syncopal episode/ decreased LOC while at [employer]. Came in with aasi. Cbg 130. Pt gcs 15 on arrival. C/o mild headache. Pt vomited one time pta. Currently undergoing chemo for left breast cancer, last chemo was Tuesday.      79-year-old female with complaint of syncopal episode about 1 hour prior to arrival.  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  Patient denies chest pain.  Patient denies abdominal pain.  Patient reports she vomited once.  Patient reports she feels normal at present but has little fatigue.  There was no witnessed seizure activity.        Review of patient's allergies indicates:   Allergen Reactions    Codeine Hallucinations    Morphine Other (See Comments)     Hypotension     Past Medical History:   Diagnosis Date    Abnormal Pap smear     last abnormal pap smear 3 years ago    Breast cancer     Diverticulitis     GERD (gastroesophageal reflux disease)     Gout     Hypertension     Thyroid disease      Past Surgical History:   Procedure Laterality Date    APPENDECTOMY      COLOSTOMY      with reversal    colostomy reversal      HYSTERECTOMY  1972    fibroid tumors    OOPHORECTOMY Bilateral 1998    BSO for chronic pelvic pain    TOTAL HIP ARTHROPLASTY Right      Family History   Problem Relation Name Age of Onset    Diabetes Mother      Kidney disease Mother      Heart disease Father      Stroke Father      Breast cancer Neg Hx      Colon cancer Neg Hx      Ovarian cancer Neg Hx       Social History     Tobacco Use    Smoking status: Never    Smokeless tobacco: Never   Substance Use Topics    Alcohol use: No    Drug use: No     Review of Systems   Constitutional:  Negative for fever.   HENT:  Negative for sore throat.    Respiratory:  Negative for shortness of breath.     Cardiovascular:  Negative for chest pain.   Gastrointestinal:  Negative for nausea.   Genitourinary:  Negative for dysuria.   Musculoskeletal:  Negative for back pain.   Skin:  Negative for rash.   Neurological:  Positive for syncope. Negative for weakness.   Hematological:  Does not bruise/bleed easily.       Physical Exam     Initial Vitals [11/16/24 1214]   BP Pulse Resp Temp SpO2   121/61 76 16 98.2 °F (36.8 °C) 98 %      MAP       --         Physical Exam    Nursing note and vitals reviewed.  Constitutional: She appears well-developed and well-nourished.   HENT:   Head: Normocephalic and atraumatic.   Eyes: Conjunctivae and EOM are normal. Pupils are equal, round, and reactive to light.   Neck: Neck supple.   Normal range of motion.  Cardiovascular:  Normal rate, regular rhythm, normal heart sounds and intact distal pulses.           Pulmonary/Chest: Breath sounds normal.   Abdominal: Abdomen is soft. There is no abdominal tenderness. There is no rebound and no guarding.   Musculoskeletal:         General: Normal range of motion.      Cervical back: Normal range of motion and neck supple.     Neurological: She is alert and oriented to person, place, and time. She has normal strength and normal reflexes.   Skin: Skin is warm and dry.   Psychiatric: She has a normal mood and affect. Her behavior is normal. Thought content normal.         ED Course   Procedures  Labs Reviewed   CBC W/ AUTO DIFFERENTIAL - Abnormal       Result Value    WBC 18.37 (*)     RBC 4.40      Hemoglobin 11.6 (*)     Hematocrit 33.9 (*)     MCV 77 (*)     MCH 26.4 (*)     MCHC 34.2      RDW 15.6 (*)     Platelets 229      MPV 9.3      Immature Granulocytes CANCELED      Immature Grans (Abs) CANCELED      Lymph # CANCELED      Mono # CANCELED      Eos # CANCELED      Baso # CANCELED      nRBC 0      Gran % 81.0 (*)     Lymph % 11.0 (*)     Mono % 2.0 (*)     Eosinophil % 0.0      Basophil % 0.0      Metamyelocytes 4.0      Myelocytes 2.0       Aniso Slight      Poik Slight      Smudge Cells Present      Differential Method Manual      Narrative:     Release to patient->Immediate   COMPREHENSIVE METABOLIC PANEL - Abnormal    Sodium 138      Potassium 3.4 (*)     Chloride 107      CO2 21 (*)     Glucose 111 (*)     BUN 28 (*)     Creatinine 1.2      Calcium 8.7      Total Protein 6.8      Albumin 3.2 (*)     Total Bilirubin 0.6      Alkaline Phosphatase 107      AST 13      ALT 16      eGFR 46.6 (*)     Anion Gap 10      Narrative:     Release to patient->Immediate   URINALYSIS, REFLEX TO URINE CULTURE - Abnormal    Specimen UA Urine, Clean Catch      Color, UA Straw      Appearance, UA Clear      pH, UA 6.0      Specific Gravity, UA 1.010      Protein, UA 1+ (*)     Glucose, UA Negative      Ketones, UA Negative      Bilirubin (UA) Negative      Occult Blood UA Trace (*)     Nitrite, UA Negative      Urobilinogen, UA Negative      Leukocytes, UA Negative      Narrative:     Specimen Source->Urine   POCT GLUCOSE - Abnormal    POCT Glucose 119 (*)    TROPONIN I    Troponin I 0.007      Narrative:     Release to patient->Immediate   URINALYSIS MICROSCOPIC    RBC, UA 0      WBC, UA 5      Bacteria Occasional      Squam Epithel, UA 2      Hyaline Casts, UA 0      Microscopic Comment SEE COMMENT      Narrative:     Specimen Source->Urine   HEPATITIS C ANTIBODY   HEP C VIRUS HOLD SPECIMEN   HIV 1 / 2 ANTIBODY     EKG Readings: (Independently Interpreted)   Other EKG Interpretations: EKG: NSR with rate of 70, non specific t wave inversion V3       Imaging Results              X-Ray Chest 1 View (Final result)  Result time 11/16/24 13:34:30      Final result by Elisa Galindo MD (Timothy) (11/16/24 13:34:30)                   Impression:      No infiltrates      Electronically signed by: Elisa Galindo MD  Date:    11/16/2024  Time:    13:34               Narrative:    EXAMINATION:  XR CHEST 1 VIEW    CLINICAL HISTORY:  Syncope,    COMPARISON:  Chest,  07/19/2021.    FINDINGS:  One view.  MediPort catheter tip at the SVC level.  Normal size heart.  Clear lungs.                                       Medications - No data to display  Medical Decision Making  2:00 PM  Patient reports feeling well.  Patient has no past or recent report of chest pain or shortness of breath.  Patient reports she feels normal.  Do not suspect pulmonary embolus because patient has no shortness of breath or chest pain.  EKG does not show any changes suggestive of pulmonary embolus.  Orthostatics were negative. VS WNL.  Patient appeared to have had a vasovagal episode of syncope after sitting down to eat.  Since workup negative, and no suspicion of pulmonary embolus, patient will be discharged.  Patient instructed to return for any chest pain, shortness of breath, or any weakness, dizziness, or syncope.    Amount and/or Complexity of Data Reviewed  Labs: ordered.  Radiology: ordered.                                      Clinical Impression:  Final diagnoses:  [R53.1] Weakness  [R55] Syncope  [D72.829] Leukocytosis, unspecified type (Primary)          ED Disposition Condition    Discharge Stable          ED Prescriptions    None       Follow-up Information       Follow up With Specialties Details Why Contact Info    Antonio Rodriguez III, MD Internal Medicine Schedule an appointment as soon as possible for a visit in 2 days  [address]  [facility] [address]  [phone]               Dony Monaco NP  11/16/24 1447       Dony Monaco NP  11/16/24 1442

## 2024-11-17 LAB
HCV AB SERPL QL IA: NEGATIVE
HEP C VIRUS HOLD SPECIMEN: NORMAL
HIV 1+2 AB+HIV1 P24 AG SERPL QL IA: NEGATIVE
OHS QRS DURATION: 78 MS
OHS QTC CALCULATION: 326 MS

## 2025-04-03 ENCOUNTER — TELEPHONE (OUTPATIENT)
Dept: HEMATOLOGY/ONCOLOGY | Facility: CLINIC | Age: 80
End: 2025-04-03
Payer: MEDICARE

## 2025-04-03 NOTE — TELEPHONE ENCOUNTER
SW reach out to pt to assist in getting pt a mastectomy bra. PEDRO signed pt up for the [facility] and [facility] collaborative free bra program. PEDRO also sent referral for financial assistance and mastectomy bras to [facility]. Pt reported having a high bill at [facility]. SW encouraged pt to contact [facility] financial assistance and  to find out if they have any services available. SW encouraged pt to contact SS as needed. SW will remain available.

## 2025-04-15 ENCOUNTER — PATIENT MESSAGE (OUTPATIENT)
Dept: NEUROLOGY | Facility: CLINIC | Age: 80
End: 2025-04-15
Payer: MEDICARE

## 2025-07-31 DIAGNOSIS — M25.562 CHRONIC PAIN OF LEFT KNEE: Primary | ICD-10-CM

## 2025-07-31 DIAGNOSIS — G89.29 CHRONIC PAIN OF LEFT KNEE: Primary | ICD-10-CM

## 2025-07-31 NOTE — PROGRESS NOTES
[address] [facility] [place] [address]   Phone [phone]  Fax [phone]           CHIEF COMPLAINT: Pain of the Left Knee       REFERRING PROVIDER: Dr. Pia White    HISTORY OF PRESENT ILLNESS (JN) (08/01/2025):    HPI:  Fanny presents for evaluation of left knee pain that started about a month ago. Pain initially began in the back area, attributed to an L5 issue, then progressed to the knee. She rates pain as 8/10 in severity and reports significant crepitus in the knee. She has difficulty fully straightening the left knee compared to the right. No previous knee injections, but she reports having had knee problems before. She occasionally takes Advil or Aleve for pain relief. She has tried using a copper brace and Voltaren gel in the past but found them ineffective.    She also reports back issues affecting the sciatic nerve, with a history of injections that had varying success. She recalls one injection working well, while another was ineffective during a cruise when she had difficulty walking.    She has a history of diverticulosis and denies having diabetes.  The majority of her knee pain is on the inside of her knee as well as behind her knee         Disclaimer: The history above was obtained through ambient listening technology thus may contain errors.     PAST MEDICAL HISTORY:    Past Medical History:   Diagnosis Date    Abnormal Pap smear     last abnormal pap smear 3 years ago    Breast cancer     Diverticulitis     GERD (gastroesophageal reflux disease)     Gout     Hypertension     Thyroid disease        Patient's Latest A1C:  Lab Results   Component Value Date    HGBA1C 6 02/04/2025          PAST SURGICAL HISTORY:    Past Surgical History:   Procedure Laterality Date    APPENDECTOMY      COLOSTOMY      with reversal    colostomy reversal      HYSTERECTOMY  01/01/1972    fibroid tumors    LUMPECTOMY, BREAST Right 2017    MASTECTOMY Left 03/06/2025    OOPHORECTOMY  Bilateral 01/01/1998    BSO for chronic pelvic pain    TOTAL HIP ARTHROPLASTY Right         MEDICATIONS:    Current Outpatient Medications:     allopurinol (ZYLOPRIM) 100 MG tablet, Take 1 tablet (100 mg total) by mouth daily as needed., Disp: 30 tablet, Rfl: 6    ascorbic acid, vitamin C, (VITAMIN C) 1000 MG tablet, Take 1,000 mg by mouth once daily., Disp: , Rfl:     b complex vitamins capsule, Take 1 capsule by mouth once daily., Disp: , Rfl:     cloNIDine (CATAPRES) 0.2 MG tablet, TAKE 1 TABLET BY MOUTH DAILY, Disp: 30 tablet, Rfl: 6    ergocalciferol (ERGOCALCIFEROL) 50,000 unit Cap, Take 1 capsule by mouth every 7 days. , Disp: , Rfl:     levothyroxine (SYNTHROID) 88 MCG tablet, Take 1 tablet by mouth Daily., Disp: , Rfl:     losartan-hydrochlorothiazide 100-25 mg (HYZAAR) 100-25 mg per tablet, Take 1 tablet by mouth Daily., Disp: , Rfl:     pantoprazole (PROTONIX) 40 MG tablet, Take 40 mg by mouth., Disp: , Rfl:     simvastatin (ZOCOR) 20 MG tablet, Take 20 mg by mouth Daily., Disp: , Rfl:     sulfamethoxazole-trimethoprim 800-160mg (BACTRIM DS) 800-160 mg Tab, Take 1 tablet by mouth., Disp: , Rfl:     tamoxifen (NOLVADEX) 20 MG Tab, Take 20 mg by mouth Daily., Disp: , Rfl:      ALLERGIES:     Review of patient's allergies indicates:   Allergen Reactions    Codeine Hallucinations    Morphine Other (See Comments)     Hypotension            FAMILY HISTORY:   Family History   Problem Relation Name Age of Onset    Diabetes Mother      Kidney disease Mother      Heart disease Father      Stroke Father      Breast cancer Neg Hx      Colon cancer Neg Hx      Ovarian cancer Neg Hx             SOCIAL HISTORY:    Social History     Socioeconomic History    Marital status:     Number of children: 0   Tobacco Use    Smoking status: Never    Smokeless tobacco: Never   Substance and Sexual Activity    Alcohol use: No    Drug use: No    Sexual activity: Never     Social Drivers of Health     Financial Resource  "Strain: Patient Declined (3/18/2025)    Received from [facility]    Overall Financial Resource Strain (CARDIA)     Difficulty of Paying Living Expenses: Patient declined   Food Insecurity: No Food Insecurity (6/3/2025)    Received from [facility]    Hunger Vital Sign     Worried About Running Out of Food in the Last Year: Never true     Ran Out of Food in the Last Year: Never true   Transportation Needs: No Transportation Needs (6/3/2025)    Received from [facility]    PRAPARE - Transportation     Lack of Transportation (Medical): No     Lack of Transportation (Non-Medical): No   Physical Activity: Patient Declined (3/18/2025)    Received from [facility]    Exercise Vital Sign     Days of Exercise per Week: Patient declined     Minutes of Exercise per Session: Patient declined   Stress: Patient Declined (3/18/2025)    Received from [facility]    [employer] - Occupational Stress Questionnaire     Feeling of Stress : Patient declined   Housing Stability: Low Risk  (6/3/2025)    Received from [facility]    Housing Stability Vital Sign     Unable to Pay for Housing in the Last Year: No     Number of Times Moved in the Last Year: 0     Homeless in the Last Year: No       PHYSICAL EXAMINATION:     Estimated body mass index is 34.96 kg/m² as calculated from the following:    Height as of this encounter: 5' 8" (1.727 m).    Weight as of this encounter: 104.3 kg (229 lb 15 oz).   MUSCULOSKELETAL:    Knee Exam (affected extremity):   Inspection:    Skin:    Discoloration   (-)   Gross deformity   (-)    Open wounds   (-)   Surgical scars   (-)      Soft tissue:     Swelling/Knee Effusion  (+)   Muscle atrophy   (-)   Palpation tenderness:    Bony:     Tibial tubercle   (-)   Patella    (-)   Tibial plateau   (-)   Femoral condyle  (-)   Soft tissue:    Pes anserine bursae  (-)   Patellar tendon   (-)   Quadriceps tendon  (-)   MCL    (-) "   LCL    (-)  ITB    (-)   ROM:   Affected extremity:        Extension:   0°            Flexion    130°            Pain    (+)           Crepitance   (+)           Sensory:     Normal sensation to light touch in Sa/Funk/DP/SP/T nerve distributions      Motor:                  Fires EHL/FHL/Tibialis anterior/Gastrocsoleus   Vascular:                 Foot WWP with brisk capillary refill      DP/PT pulses palpable   Special tests:    Collateral ligaments:     Stable to varus and valgus stress at 0° and 30° of knee flexion      Cruciate ligaments:   Lachman   (-)   Pivot shift   (-)   Anterior drawer   (-)   Posterior drawer  (-)  Menisci:     Medial JLT   (+)   Lateral JLT   (+)    Apley/Jacquelyns test  (-)  Patella:    Patellar grind/Blanco's Test (+)   Patellar apprehension  (-).        IMAGING:      X-Ray Knee Complete 4 or More Views Left  Narrative: EXAMINATION:  XR KNEE COMP 4 OR MORE VIEWS LEFT    CLINICAL HISTORY:  Pain in left knee    TECHNIQUE:  AP, Lateral, Sunrise and Tunnel views of the left knee were preformed    COMPARISON:  None    FINDINGS:  Bilateral multi compartment degenerative findings most prevalent within the left greater than right patellofemoral and left knee medial compartment including joint space loss.  No large joint effusion or acute osseous abnormality.  Impression: As above    Electronically signed by: Frantz Christine MD  Date:    08/01/2025  Time:    09:03         ASSESSMENT: 80 y.o. female  with:   Left knee arthritis s/p steroid injection 8/4/25    PLAN:  Data:  I independently visualized images including but not limited to xrays, MRI, or CT scan today  I reviewed available old/outside records  Medications:  History of diverticulosis.  Perhaps avoid anti-inflammatories?  DME and weightbearing status:  Hinged knee brace prescribed today. 15 minutes were spent sizing, fitting, and educating for durable medical equipment application today.  [id number].    Limit pounding through her  knee  Injections/Procedures/Surgey:  She received a left knee steroid injection today.  If her steroid shot does not work she would be a candidate for Visco supplement injections  Education:  I had a long discussion with the patient about the causes, treatments, and prognosis/natural history for this condition.  We discussed effective ways to improve symptoms as well as what types of activities may make the symptoms/prognosis worse. We discussed signs and symptoms and other reasons to return to clinic sooner.  I had a long discussion with the patient about the causes, treatments, and prognosis for knee arthritis. We discussed that although there is not a cure for arthritis, there are effective ways to improve symptoms. Patient educated that exercise/weight loss can reduce pain and improve overall health in most people. I recommended low impact activities such as elliptical and bicycle. I talked about the fact that aquatic and pool therapy can be helpful. We talked about the importance of knee motion in the health of the knee. Knee braces may help limit swelling and provide proprioceptive feedback. We recommend formal physical therapy or at minimum a home exercise program, which we discussed with the patient.   Return to clinic:  4-6 weeks with Cristel  Images needed at next visit: None    This note was generated with the assistance of ambient listening technology thus some errors may exist.  Please contact the author of this note for any clarification.           Physician Signature: Salome Paiz M.D.       Official Website  Schedule An Appointment

## 2025-08-01 ENCOUNTER — HOSPITAL ENCOUNTER (OUTPATIENT)
Dept: RADIOLOGY | Facility: HOSPITAL | Age: 80
Discharge: HOME | End: 2025-08-01
Attending: ORTHOPAEDIC SURGERY
Payer: MEDICARE

## 2025-08-01 ENCOUNTER — OFFICE VISIT (OUTPATIENT)
Dept: ORTHOPEDICS | Facility: CLINIC | Age: 80
End: 2025-08-01
Payer: MEDICARE

## 2025-08-01 VITALS — WEIGHT: 229.94 LBS | BODY MASS INDEX: 34.85 KG/M2 | HEIGHT: 68 IN

## 2025-08-01 DIAGNOSIS — M17.12 ARTHRITIS OF LEFT KNEE: Primary | ICD-10-CM

## 2025-08-01 DIAGNOSIS — M25.562 CHRONIC PAIN OF LEFT KNEE: ICD-10-CM

## 2025-08-01 DIAGNOSIS — G89.29 CHRONIC PAIN OF LEFT KNEE: ICD-10-CM

## 2025-08-01 PROCEDURE — 1101F PT FALLS ASSESS-DOCD LE1/YR: CPT | Mod: CPTII,S$GLB,, | Performed by: ORTHOPAEDIC SURGERY

## 2025-08-01 PROCEDURE — 3288F FALL RISK ASSESSMENT DOCD: CPT | Mod: CPTII,S$GLB,, | Performed by: ORTHOPAEDIC SURGERY

## 2025-08-01 PROCEDURE — 1159F MED LIST DOCD IN RCRD: CPT | Mod: CPTII,S$GLB,, | Performed by: ORTHOPAEDIC SURGERY

## 2025-08-01 PROCEDURE — 20610 DRAIN/INJ JOINT/BURSA W/O US: CPT | Mod: LT,S$GLB,, | Performed by: ORTHOPAEDIC SURGERY

## 2025-08-01 PROCEDURE — 1125F AMNT PAIN NOTED PAIN PRSNT: CPT | Mod: CPTII,S$GLB,, | Performed by: ORTHOPAEDIC SURGERY

## 2025-08-01 PROCEDURE — 73564 X-RAY EXAM KNEE 4 OR MORE: CPT | Mod: 26,LT,, | Performed by: RADIOLOGY

## 2025-08-01 PROCEDURE — 73564 X-RAY EXAM KNEE 4 OR MORE: CPT | Mod: TC,LT

## 2025-08-01 PROCEDURE — 97760 ORTHOTIC MGMT&TRAING 1ST ENC: CPT | Mod: GP,S$GLB,, | Performed by: ORTHOPAEDIC SURGERY

## 2025-08-01 PROCEDURE — 99204 OFFICE O/P NEW MOD 45 MIN: CPT | Mod: 25,S$GLB,, | Performed by: ORTHOPAEDIC SURGERY

## 2025-08-01 PROCEDURE — 99999 PR PBB SHADOW E&M-EST. PATIENT-LVL III: CPT | Mod: PBBFAC,,, | Performed by: ORTHOPAEDIC SURGERY

## 2025-08-01 RX ORDER — IBUPROFEN 100 MG/5ML
1000 SUSPENSION, ORAL (FINAL DOSE FORM) ORAL DAILY
COMMUNITY

## 2025-08-01 RX ORDER — PANTOPRAZOLE SODIUM 40 MG/1
40 TABLET, DELAYED RELEASE ORAL
COMMUNITY
Start: 2025-07-15

## 2025-08-01 RX ORDER — VITAMIN B COMPLEX
1 CAPSULE ORAL DAILY
COMMUNITY

## 2025-08-01 RX ORDER — SULFAMETHOXAZOLE AND TRIMETHOPRIM 800; 160 MG/1; MG/1
1 TABLET ORAL
COMMUNITY
Start: 2025-07-29 | End: 2025-08-12

## 2025-08-01 RX ADMIN — BETAMETHASONE SODIUM PHOSPHATE AND BETAMETHASONE ACETATE 6 MG: 3; 3 INJECTION, SUSPENSION INTRA-ARTICULAR; INTRALESIONAL; INTRAMUSCULAR; SOFT TISSUE at 09:08

## 2025-08-01 NOTE — PROCEDURES
Large Joint Aspiration/Injection: L knee    Date/Time: 8/1/2025 9:50 AM    Performed by: Salome Paiz MD  Authorized by: Salome Paiz MD    Local anesthetic:  Bupivacaine 0.5% without epinephrine and lidocaine 1% without epinephrine  Anesthetic total (ml):  4      Details:  Needle Size:  22 G  Ultrasonic Guidance for needle placement?: No    Approach:  Anterolateral  Location:  Knee  Site:  L knee  Medications:  6 mg betamethasone acetate-betamethasone sodium phosphate 6 mg/mL  Patient tolerance:  Patient tolerated the procedure well with no immediate complications

## 2025-08-04 RX ORDER — BETAMETHASONE SODIUM PHOSPHATE AND BETAMETHASONE ACETATE 3; 3 MG/ML; MG/ML
6 INJECTION, SUSPENSION INTRA-ARTICULAR; INTRALESIONAL; INTRAMUSCULAR; SOFT TISSUE
Status: DISCONTINUED | OUTPATIENT
Start: 2025-08-01 | End: 2025-08-04 | Stop reason: HOSPADM